# Patient Record
Sex: FEMALE | NOT HISPANIC OR LATINO
[De-identification: names, ages, dates, MRNs, and addresses within clinical notes are randomized per-mention and may not be internally consistent; named-entity substitution may affect disease eponyms.]

---

## 2017-09-07 PROBLEM — Z00.00 ENCOUNTER FOR PREVENTIVE HEALTH EXAMINATION: Status: ACTIVE | Noted: 2017-09-07

## 2017-09-11 ENCOUNTER — APPOINTMENT (OUTPATIENT)
Dept: BREAST CENTER | Facility: CLINIC | Age: 32
End: 2017-09-11
Payer: COMMERCIAL

## 2017-09-11 VITALS
HEIGHT: 62 IN | WEIGHT: 110 LBS | DIASTOLIC BLOOD PRESSURE: 66 MMHG | HEART RATE: 76 BPM | SYSTOLIC BLOOD PRESSURE: 93 MMHG | BODY MASS INDEX: 20.24 KG/M2

## 2017-09-11 DIAGNOSIS — Z78.9 OTHER SPECIFIED HEALTH STATUS: ICD-10-CM

## 2017-09-11 PROCEDURE — 99244 OFF/OP CNSLTJ NEW/EST MOD 40: CPT

## 2017-09-12 ENCOUNTER — TRANSCRIPTION ENCOUNTER (OUTPATIENT)
Age: 32
End: 2017-09-12

## 2017-09-18 ENCOUNTER — FORM ENCOUNTER (OUTPATIENT)
Age: 32
End: 2017-09-18

## 2017-09-19 ENCOUNTER — OUTPATIENT (OUTPATIENT)
Dept: OUTPATIENT SERVICES | Facility: HOSPITAL | Age: 32
LOS: 1 days | End: 2017-09-19
Payer: COMMERCIAL

## 2017-09-19 ENCOUNTER — RESULT REVIEW (OUTPATIENT)
Age: 32
End: 2017-09-19

## 2017-09-19 PROCEDURE — G0204: CPT | Mod: 26

## 2017-09-19 PROCEDURE — 88305 TISSUE EXAM BY PATHOLOGIST: CPT

## 2017-09-19 PROCEDURE — 76641 ULTRASOUND BREAST COMPLETE: CPT | Mod: 26,LT

## 2017-09-19 PROCEDURE — 77066 DX MAMMO INCL CAD BI: CPT

## 2017-09-19 PROCEDURE — 19084 BX BREAST ADD LESION US IMAG: CPT

## 2017-09-19 PROCEDURE — 19083 BX BREAST 1ST LESION US IMAG: CPT | Mod: RT

## 2017-09-19 PROCEDURE — 19083 BX BREAST 1ST LESION US IMAG: CPT

## 2017-09-19 PROCEDURE — 76641 ULTRASOUND BREAST COMPLETE: CPT

## 2017-09-19 PROCEDURE — A4648: CPT

## 2017-09-19 PROCEDURE — 19084 BX BREAST ADD LESION US IMAG: CPT | Mod: RT

## 2017-09-20 ENCOUNTER — MESSAGE (OUTPATIENT)
Age: 32
End: 2017-09-20

## 2017-09-20 LAB — SURGICAL PATHOLOGY STUDY: SIGNIFICANT CHANGE UP

## 2017-09-21 ENCOUNTER — OTHER (OUTPATIENT)
Age: 32
End: 2017-09-21

## 2017-10-05 ENCOUNTER — APPOINTMENT (OUTPATIENT)
Dept: PLASTIC SURGERY | Facility: CLINIC | Age: 32
End: 2017-10-05
Payer: COMMERCIAL

## 2017-10-05 PROCEDURE — 99202 OFFICE O/P NEW SF 15 MIN: CPT

## 2017-10-11 VITALS
DIASTOLIC BLOOD PRESSURE: 52 MMHG | RESPIRATION RATE: 18 BRPM | HEIGHT: 62 IN | OXYGEN SATURATION: 99 % | HEART RATE: 67 BPM | SYSTOLIC BLOOD PRESSURE: 101 MMHG | WEIGHT: 111.33 LBS | TEMPERATURE: 98 F

## 2017-10-12 ENCOUNTER — RESULT REVIEW (OUTPATIENT)
Age: 32
End: 2017-10-12

## 2017-10-12 ENCOUNTER — OUTPATIENT (OUTPATIENT)
Dept: INPATIENT UNIT | Facility: HOSPITAL | Age: 32
LOS: 1 days | Discharge: HOME | End: 2017-10-12
Payer: COMMERCIAL

## 2017-10-12 ENCOUNTER — APPOINTMENT (OUTPATIENT)
Dept: BREAST CENTER | Facility: HOSPITAL | Age: 32
End: 2017-10-12

## 2017-10-12 ENCOUNTER — APPOINTMENT (OUTPATIENT)
Dept: PLASTIC SURGERY | Facility: HOSPITAL | Age: 32
End: 2017-10-12

## 2017-10-12 VITALS — OXYGEN SATURATION: 100 % | SYSTOLIC BLOOD PRESSURE: 97 MMHG | HEART RATE: 52 BPM | DIASTOLIC BLOOD PRESSURE: 57 MMHG

## 2017-10-12 DIAGNOSIS — Z41.9 ENCOUNTER FOR PROCEDURE FOR PURPOSES OTHER THAN REMEDYING HEALTH STATE, UNSPECIFIED: Chronic | ICD-10-CM

## 2017-10-12 PROCEDURE — 88307 TISSUE EXAM BY PATHOLOGIST: CPT

## 2017-10-12 PROCEDURE — 19301 PARTIAL MASTECTOMY: CPT | Mod: RT,GC

## 2017-10-12 PROCEDURE — 19366: CPT

## 2017-10-12 PROCEDURE — 19120 REMOVAL OF BREAST LESION: CPT | Mod: RT

## 2017-10-12 PROCEDURE — 88305 TISSUE EXAM BY PATHOLOGIST: CPT

## 2017-10-12 NOTE — BRIEF OPERATIVE NOTE - OPERATION/FINDINGS
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 Wound closure and tissue rearrangement by plastic surgery, incision closed primarily with monocryl and nylon

## 2017-10-12 NOTE — PACU DISCHARGE NOTE - COMMENTS
VSS. denies pain or discomfort. . pt able to tolerate light snack. pt and family educated on medications, safety and follow up. Discharged home stable. pt left unit accompanied by family

## 2017-10-12 NOTE — BRIEF OPERATIVE NOTE - PROCEDURE
<<-----Click on this checkbox to enter Procedure Wound closure  10/12/2017    Active  [facility]  Lumpectomy of breast  10/12/2017    Active  [facility]

## 2017-10-13 LAB — SURGICAL PATHOLOGY STUDY: SIGNIFICANT CHANGE UP

## 2017-10-16 DIAGNOSIS — N64.89 OTHER SPECIFIED DISORDERS OF BREAST: ICD-10-CM

## 2017-10-16 DIAGNOSIS — D24.1 BENIGN NEOPLASM OF RIGHT BREAST: ICD-10-CM

## 2017-10-18 ENCOUNTER — APPOINTMENT (OUTPATIENT)
Dept: BREAST CENTER | Facility: CLINIC | Age: 32
End: 2017-10-18
Payer: COMMERCIAL

## 2017-10-18 VITALS
DIASTOLIC BLOOD PRESSURE: 64 MMHG | HEIGHT: 62 IN | HEART RATE: 70 BPM | SYSTOLIC BLOOD PRESSURE: 92 MMHG | TEMPERATURE: 97.7 F | WEIGHT: 110 LBS | BODY MASS INDEX: 20.24 KG/M2

## 2017-10-18 DIAGNOSIS — R92.8 OTHER ABNORMAL AND INCONCLUSIVE FINDINGS ON DIAGNOSTIC IMAGING OF BREAST: ICD-10-CM

## 2017-10-18 DIAGNOSIS — N63.10 UNSPECIFIED LUMP IN THE RIGHT BREAST, UNSPECIFIED QUADRANT: ICD-10-CM

## 2017-10-18 PROCEDURE — 99213 OFFICE O/P EST LOW 20 MIN: CPT

## 2017-10-19 PROBLEM — N15.9 RENAL TUBULO-INTERSTITIAL DISEASE, UNSPECIFIED: Chronic | Status: ACTIVE | Noted: 2017-10-11

## 2018-03-20 ENCOUNTER — APPOINTMENT (OUTPATIENT)
Dept: BREAST CENTER | Facility: CLINIC | Age: 33
End: 2018-03-20
Payer: COMMERCIAL

## 2018-03-20 VITALS — HEART RATE: 74 BPM | SYSTOLIC BLOOD PRESSURE: 119 MMHG | DIASTOLIC BLOOD PRESSURE: 67 MMHG

## 2018-03-20 DIAGNOSIS — Z12.31 ENCOUNTER FOR SCREENING MAMMOGRAM FOR MALIGNANT NEOPLASM OF BREAST: ICD-10-CM

## 2018-03-20 PROCEDURE — 99213 OFFICE O/P EST LOW 20 MIN: CPT

## 2021-01-15 NOTE — ASU PATIENT PROFILE, ADULT - BILL OF RIGHTS/ADMISSION INFORMATION PROVIDED TO:
MD Notification    Notified Person: MD    Notified Person Name: Davin    Notification Date/Time: January 15, 2021 8:21 AM     Notification Interaction: paged    Purpose of Notification: FYI: Pt BP 82/51. Received Zyprexa @ ~ 0700.     Orders Received:    Comments: alerted bed side nurse who asked after repeat bp to page    Patient